# Patient Record
Sex: FEMALE | Race: BLACK OR AFRICAN AMERICAN | NOT HISPANIC OR LATINO | URBAN - METROPOLITAN AREA
[De-identification: names, ages, dates, MRNs, and addresses within clinical notes are randomized per-mention and may not be internally consistent; named-entity substitution may affect disease eponyms.]

---

## 2019-07-23 ENCOUNTER — OUTPATIENT (OUTPATIENT)
Dept: OUTPATIENT SERVICES | Facility: HOSPITAL | Age: 39
LOS: 1 days | Discharge: HOME | End: 2019-07-23

## 2019-07-24 DIAGNOSIS — Z01.21 ENCOUNTER FOR DENTAL EXAMINATION AND CLEANING WITH ABNORMAL FINDINGS: ICD-10-CM

## 2019-08-09 ENCOUNTER — OUTPATIENT (OUTPATIENT)
Dept: OUTPATIENT SERVICES | Facility: HOSPITAL | Age: 39
LOS: 1 days | Discharge: HOME | End: 2019-08-09

## 2025-02-20 ENCOUNTER — EMERGENCY (EMERGENCY)
Facility: HOSPITAL | Age: 45
LOS: 0 days | Discharge: HOME | End: 2025-02-20
Attending: STUDENT IN AN ORGANIZED HEALTH CARE EDUCATION/TRAINING PROGRAM
Payer: MEDICAID

## 2025-02-20 VITALS
DIASTOLIC BLOOD PRESSURE: 85 MMHG | HEART RATE: 101 BPM | RESPIRATION RATE: 20 BRPM | SYSTOLIC BLOOD PRESSURE: 137 MMHG | OXYGEN SATURATION: 98 % | TEMPERATURE: 98 F | WEIGHT: 167.99 LBS | HEIGHT: 65 IN

## 2025-02-20 VITALS
RESPIRATION RATE: 19 BRPM | HEART RATE: 84 BPM | OXYGEN SATURATION: 100 % | SYSTOLIC BLOOD PRESSURE: 132 MMHG | DIASTOLIC BLOOD PRESSURE: 79 MMHG | TEMPERATURE: 98 F

## 2025-02-20 DIAGNOSIS — R10.11 RIGHT UPPER QUADRANT PAIN: ICD-10-CM

## 2025-02-20 DIAGNOSIS — R68.83 CHILLS (WITHOUT FEVER): ICD-10-CM

## 2025-02-20 DIAGNOSIS — N83.8 OTHER NONINFLAMMATORY DISORDERS OF OVARY, FALLOPIAN TUBE AND BROAD LIGAMENT: ICD-10-CM

## 2025-02-20 DIAGNOSIS — R10.31 RIGHT LOWER QUADRANT PAIN: ICD-10-CM

## 2025-02-20 DIAGNOSIS — D25.9 LEIOMYOMA OF UTERUS, UNSPECIFIED: ICD-10-CM

## 2025-02-20 LAB
ALBUMIN SERPL ELPH-MCNC: 4.2 G/DL — SIGNIFICANT CHANGE UP (ref 3.5–5.2)
ALP SERPL-CCNC: 75 U/L — SIGNIFICANT CHANGE UP (ref 30–115)
ALT FLD-CCNC: 16 U/L — SIGNIFICANT CHANGE UP (ref 0–41)
ANION GAP SERPL CALC-SCNC: 10 MMOL/L — SIGNIFICANT CHANGE UP (ref 7–14)
APPEARANCE UR: CLEAR — SIGNIFICANT CHANGE UP
AST SERPL-CCNC: 25 U/L — SIGNIFICANT CHANGE UP (ref 0–41)
BASOPHILS # BLD AUTO: 0.05 K/UL — SIGNIFICANT CHANGE UP (ref 0–0.2)
BASOPHILS NFR BLD AUTO: 0.6 % — SIGNIFICANT CHANGE UP (ref 0–1)
BILIRUB SERPL-MCNC: <0.2 MG/DL — SIGNIFICANT CHANGE UP (ref 0.2–1.2)
BILIRUB UR-MCNC: NEGATIVE — SIGNIFICANT CHANGE UP
BUN SERPL-MCNC: 8 MG/DL — LOW (ref 10–20)
CALCIUM SERPL-MCNC: 8.9 MG/DL — SIGNIFICANT CHANGE UP (ref 8.4–10.5)
CHLORIDE SERPL-SCNC: 101 MMOL/L — SIGNIFICANT CHANGE UP (ref 98–110)
CO2 SERPL-SCNC: 23 MMOL/L — SIGNIFICANT CHANGE UP (ref 17–32)
COLOR SPEC: YELLOW — SIGNIFICANT CHANGE UP
CREAT SERPL-MCNC: 0.9 MG/DL — SIGNIFICANT CHANGE UP (ref 0.7–1.5)
DIFF PNL FLD: NEGATIVE — SIGNIFICANT CHANGE UP
EGFR: 80 ML/MIN/1.73M2 — SIGNIFICANT CHANGE UP
EOSINOPHIL # BLD AUTO: 0.48 K/UL — SIGNIFICANT CHANGE UP (ref 0–0.7)
EOSINOPHIL NFR BLD AUTO: 6.1 % — SIGNIFICANT CHANGE UP (ref 0–8)
GLUCOSE SERPL-MCNC: 108 MG/DL — HIGH (ref 70–99)
GLUCOSE UR QL: NEGATIVE MG/DL — SIGNIFICANT CHANGE UP
HCG SERPL QL: NEGATIVE — SIGNIFICANT CHANGE UP
HCT VFR BLD CALC: 33.3 % — LOW (ref 37–47)
HGB BLD-MCNC: 10.8 G/DL — LOW (ref 12–16)
IMM GRANULOCYTES NFR BLD AUTO: 0.3 % — SIGNIFICANT CHANGE UP (ref 0.1–0.3)
KETONES UR-MCNC: NEGATIVE MG/DL — SIGNIFICANT CHANGE UP
LEUKOCYTE ESTERASE UR-ACNC: NEGATIVE — SIGNIFICANT CHANGE UP
LIDOCAIN IGE QN: 45 U/L — SIGNIFICANT CHANGE UP (ref 7–60)
LYMPHOCYTES # BLD AUTO: 1.58 K/UL — SIGNIFICANT CHANGE UP (ref 1.2–3.4)
LYMPHOCYTES # BLD AUTO: 20 % — LOW (ref 20.5–51.1)
MCHC RBC-ENTMCNC: 28.1 PG — SIGNIFICANT CHANGE UP (ref 27–31)
MCHC RBC-ENTMCNC: 32.4 G/DL — SIGNIFICANT CHANGE UP (ref 32–37)
MCV RBC AUTO: 86.5 FL — SIGNIFICANT CHANGE UP (ref 81–99)
MONOCYTES # BLD AUTO: 0.87 K/UL — HIGH (ref 0.1–0.6)
MONOCYTES NFR BLD AUTO: 11 % — HIGH (ref 1.7–9.3)
NEUTROPHILS # BLD AUTO: 4.89 K/UL — SIGNIFICANT CHANGE UP (ref 1.4–6.5)
NEUTROPHILS NFR BLD AUTO: 62 % — SIGNIFICANT CHANGE UP (ref 42.2–75.2)
NITRITE UR-MCNC: NEGATIVE — SIGNIFICANT CHANGE UP
NRBC BLD AUTO-RTO: 0 /100 WBCS — SIGNIFICANT CHANGE UP (ref 0–0)
PH UR: 7.5 — SIGNIFICANT CHANGE UP (ref 5–8)
PLATELET # BLD AUTO: 413 K/UL — HIGH (ref 130–400)
PMV BLD: 9.5 FL — SIGNIFICANT CHANGE UP (ref 7.4–10.4)
POTASSIUM SERPL-MCNC: 4.6 MMOL/L — SIGNIFICANT CHANGE UP (ref 3.5–5)
POTASSIUM SERPL-SCNC: 4.6 MMOL/L — SIGNIFICANT CHANGE UP (ref 3.5–5)
PROT SERPL-MCNC: 6.3 G/DL — SIGNIFICANT CHANGE UP (ref 6–8)
PROT UR-MCNC: NEGATIVE MG/DL — SIGNIFICANT CHANGE UP
RBC # BLD: 3.85 M/UL — LOW (ref 4.2–5.4)
RBC # FLD: 14.4 % — SIGNIFICANT CHANGE UP (ref 11.5–14.5)
SODIUM SERPL-SCNC: 134 MMOL/L — LOW (ref 135–146)
SP GR SPEC: 1.01 — SIGNIFICANT CHANGE UP (ref 1–1.03)
UROBILINOGEN FLD QL: 0.2 MG/DL — SIGNIFICANT CHANGE UP (ref 0.2–1)
WBC # BLD: 7.89 K/UL — SIGNIFICANT CHANGE UP (ref 4.8–10.8)
WBC # FLD AUTO: 7.89 K/UL — SIGNIFICANT CHANGE UP (ref 4.8–10.8)

## 2025-02-20 PROCEDURE — 74177 CT ABD & PELVIS W/CONTRAST: CPT | Mod: 26

## 2025-02-20 PROCEDURE — 99283 EMERGENCY DEPT VISIT LOW MDM: CPT

## 2025-02-20 PROCEDURE — 76856 US EXAM PELVIC COMPLETE: CPT | Mod: 26

## 2025-02-20 PROCEDURE — 99285 EMERGENCY DEPT VISIT HI MDM: CPT

## 2025-02-20 PROCEDURE — 99284 EMERGENCY DEPT VISIT MOD MDM: CPT | Mod: 25

## 2025-02-20 RX ORDER — ALPRAZOLAM 2 MG
0.5 TABLET ORAL ONCE
Refills: 0 | Status: DISCONTINUED | OUTPATIENT
Start: 2025-02-20 | End: 2025-02-20

## 2025-02-20 RX ORDER — MORPHINE SULFATE 60 MG/1
4 TABLET, FILM COATED, EXTENDED RELEASE ORAL ONCE
Refills: 0 | Status: DISCONTINUED | OUTPATIENT
Start: 2025-02-20 | End: 2025-02-20

## 2025-02-20 RX ADMIN — Medication 0.5 MILLIGRAM(S): at 16:19

## 2025-02-20 NOTE — ED PROVIDER NOTE - PROGRESS NOTE DETAILS
Unable to get transvaginal as patient has never had intercourse, is very painful per tactile and per patient.  GYN is aware of this and would like to see patient at the [facility].  Patient agreeable for transfer Unable to get transvaginal as patient has never had intercourse, is very painful per tech and per patient.  GYN is aware of this and would like to see patient at the [facility].  Patient agreeable for transfer Salinas Bojorquez, DO: arrived at [place]. pt comfortable at this time. GYN aware. Salinas Bojorquez, DO: domingo evaluated pt. recs appreciated. will dc with outpt followup.

## 2025-02-20 NOTE — ED PROVIDER NOTE - PHYSICAL EXAMINATION
Physical Exam    Vital Signs: I have reviewed the initial vital signs.  Constitutional: appears stated age, no acute distress  Eyes: Conjunctiva pink, Sclera clear,  Cardiovascular: S1 and S2, regular rate, regular rhythm, well-perfused extremities, radial pulses equal and 2+, pedal pulses 2+ and equal  Respiratory: unlabored respiratory effort, clear to auscultation bilaterally no wheezing, rales and rhonchi  Gastrointestinal: soft,  Tenderness palpation suprapubic and right lower quadrant., no pulsatile mass, normal bowl sounds  Musculoskeletal: supple neck, no lower extremity edema, no midline tenderness  Integumentary: warm, dry, no rash  Neurologic: awake, alert, nvi

## 2025-02-20 NOTE — CONSULT NOTE ADULT - SUBJECTIVE AND OBJECTIVE BOX
Chief Complaint: RLQ pain    HPI: 45yo , LMP 25, sent from [facility] ED with RLQ pain, now resolved, that began this AM.  Patient states she had sharp pain in her RLQ that woke her out of sleep and lasted for a few hours.  During this episode, patient experienced chills and a subjective fever.  Patient rated the pain a 4/10, and now a 0/10.  Patient did not receive any pain medication in the ED or at home.  States she gets regular monthly cycles, which are heavy for the first 3 days, requiring 2-3pads/day.  Reports mild abdominal cramping with the beginning of her period.  Patient has not seen a gyn, but did have a TVUS in 2018 that was negative.    Ob/Gyn History:                 LMP - 25                   Cycle Length - regular, monthly  Denies history of ovarian cysts, uterine fibroids prior to today's findings, or STIs  Last Pap Smear - never  Last Mammogram - never    Patient seen and examined bedside. Pain well controlled at this time. No complaints at this time. Home Medications:      Allergies    No Known Allergies    Intolerances        PAST MEDICAL & SURGICAL HISTORY:      FAMILY HISTORY: denies h/o bleeding/clotting disorders, ovarian, uterine, or breast cancer      SOCIAL HISTORY: Denies cigarette use, alcohol use, or illicit drug use    Vital Signs Last 24 Hrs  T(F): 97.7 (2025 18:40), Max: 97.8 (2025 12:02)  HR: 84 (2025 18:40) (84 - 101)  BP: 132/79 (2025 18:40) (132/79 - 137/85)  RR: 19 (2025 18:40) (19 - 20)  Height (cm): 165.1 (25 @ 12:02)  Weight (kg): 76.2 (25 @ 12:02)  BMI (kg/m2): 28 (25 @ 12:02)  BSA (m2): 1.84 (25 @ 12:02)    General Appearance - AAOx3, NAD  Heart - S1S2 regular rate and rhythm  Lung - CTA Bilaterally  Abdomen - Soft, nontender, nondistended, no rebound, no rigidity, no guarding, bowel sounds present    GYN/Pelvis: declined pelvic exam    Meds:   ALPRAZolam 0.5 milliGRAM(s) Oral Once  morphine  - Injectable 4 milliGRAM(s) IV Push Once    Height (cm): 165.1 (25 @ 12:02)  Weight (kg): 76.2 (25 @ 12:02)  BMI (kg/m2): 28 (25 @ 12:02)  BSA (m2): 1.84 (25 @ 12:02)    LABS:                        10.8   7.89  )-----------( 413      ( 2025 12:33 )             33.3             134[L]  |  101  |  8[L]  ----------------------------<  108[H]  4.6   |  23  |  0.9    Ca    8.9      2025 12:33    TPro  6.3  /  Alb  4.2  /  TBili  <0.2  /  DBili  x   /  AST  25  /  ALT  16  /  AlkPhos  75        Urinalysis Basic - ( 2025 12:49 )    Color: Yellow / Appearance: Clear / S.009 / pH: x  Gluc: x / Ketone: Negative mg/dL  / Bili: Negative / Urobili: 0.2 mg/dL   Blood: x / Protein: Negative mg/dL / Nitrite: Negative   Leuk Esterase: Negative / RBC: x / WBC x   Sq Epi: x / Non Sq Epi: x / Bacteria: x          RADIOLOGY & ADDITIONAL STUDIES:    < from: US Pelvis Complete (US Pelvis Complete .) (25 @ 14:10) >  ACC: [id number] EXAM:  US PELVIC COMPLETE   ORDERED BY: MELANIA MONSIVAIS     PROCEDURE DATE:  2025          INTERPRETATION:  CLINICAL INFORMATION: Left adnexal mass, abdominal pain    LMP: 2025    COMPARISON: Same day CT abdomen and pelvis    TECHNIQUE:  Transabdominal pelvic sonogram only. Color and Spectral Doppler was   performed.    FINDINGS:  Uterus: 13.7 cm x 7.8 cm x 10.8 cm. Multiple fibroids measuring up to 5.8   cm  Endometrium: 8 mm. Within normal limits.    Right ovary: 4.1cm x 2.8 cm x 3.4 cm. Within normal limits. Normal   arterial and venous waveforms.  Left ovary: Not visualized. Left adnexal hypoechoic structure measuring   7.4 x 4.9 x 5.5 cm and does not appear to demonstrate vascular flow.    Fluid: None.    IMPRESSION:    Left adnexal hypoechoic structure measuring up to 7.4 cm and does not   appear to demonstrate vascular flow. Findings may represent a hemorrhagic   cyst or endometriosis.    Fibroid uterus.    --- End of Report ---          JOYCE ROMAN MD; Resident Radiologist  This document has been electronically signed.  FAVIAN SAUCEDO MD; Attending Interventional Radiologist  This document has been electronically signed. 2025  2:39PM    < end of copied text >  < from: CT Abdomen and Pelvis w/ IV Cont (25 @ 13:38) >  ACC: [id number] EXAM:  CT ABDOMEN AND PELVIS IC   ORDERED BY: MELANIA MONSIVAIS     PROCEDURE DATE:  2025          INTERPRETATION:  CLINICAL INFORMATION: Abdominal pain    COMPARISON: None.    CONTRAST/COMPLICATIONS:  IV Contrast: Omnipaque 350 95 cc administered   5 cc discarded  Oral Contrast: NONE  Complications: None.    PROCEDURE:  CT of the Abdomen and Pelvis was performed.  Sagittal and coronal reformats were performed.    FINDINGS:  LOWER CHEST: Within normal limits.    LIVER: Within normal limits.  BILE DUCTS: Normal caliber.  GALLBLADDER: Within normal limits.  SPLEEN: Within normal limits.  PANCREAS: Within normal limits.  ADRENALS: Within normal limits.  KIDNEYS/URETERS: Mild right hydronephrosis. Symmetric renal enhancement.   Subcentimeter right renal hypodensity too small to further characterize.    BLADDER: Within normal limits.  REPRODUCTIVE ORGANS: Bulky uterine masses/fibroids including possibly   pedunculated right-sided fibroid. Left adnexal 8.7 x 5.2 cm indeterminate   hypodense mass (series 301 image 86).    BOWEL: No bowel obstruction. Appendix is normal.  PERITONEUM/RETROPERITONEUM: Within normal limits.  VESSELS: Within normal limits.  LYMPH NODES: No lymphadenopathy.  ABDOMINAL WALL: Within normal limits.  BONES: Within normal limits.    IMPRESSION:    Bulky uterine masses/fibroids with likely mass effect on right ureter   resulting in mild hydronephrosis.    Indeterminate left adnexal 8.7 cm hypodense mass.    --- End of Report ---            ELLIOT LANDAU MD; Attending Radiologist  This document has been electronically signed. 2025  1:44PM    < end of copied text >

## 2025-02-20 NOTE — ED PROVIDER NOTE - PATIENT PORTAL LINK FT
You can access the FollowMyHealth Patient Portal offered by [facility] by registering at the following website: [email or web address]. By joining [facility]’s FollowMyHealth portal, you will also be able to view your health information using other applications (apps) compatible with our system.

## 2025-02-20 NOTE — ED PROVIDER NOTE - OBJECTIVE STATEMENT
44-year-old female no past medical history presents emergency department for 44-year-old female presents ER for right lower abdominal pain started this morning associated with chills. Denies fever, chills, cp, sob, nvd, dysuria, hematuria.

## 2025-02-20 NOTE — CONSULT NOTE ADULT - ATTENDING COMMENTS
Patient seen and examined at bedside in [facility] for consult from ER for ovarian cyst. Patient initially presented to [facility] ER for RLQ pain and found to have a left adnexal cyst, possible pedunculated right uterine fibroid. Patient currently reports pain has resolved. Patient unable to tolerate a transvaginal US and cyst difficult to characterize with TAUS due to location posterior to fibroid uterus. Discussed with patient etiologies of cysts and need for further outpatient imaging and follow up to further characterize cyst as well as resolution. Discussed fibroid uterus and possiblity of pedunculated fibroid torsion as well. Dispo per ER. Pain precautions given

## 2025-02-20 NOTE — ED PROVIDER NOTE - NSFOLLOWUPCLINICS_GEN_ALL_ED_FT
[facility]  OB/GYN  [address] [place] [address]  Phone: [phone]  Fax:   Follow Up Time: Routine

## 2025-02-20 NOTE — ED ADULT NURSE REASSESSMENT NOTE - NS ED NURSE REASSESS COMMENT FT1
keyla from [facility] sent for ro torsion pt A&OX4, VSS cleared by Domenic Anderson for [facility] ED

## 2025-02-20 NOTE — CONSULT NOTE ADULT - ASSESSMENT
A/P: 45yo G0, LMP 2/6/25, with resolved RLQ pain, found to have fibroid uterus and 8.7cm left adnexal mass on CT.  Clinically and hemodynamically stable.    -no acute gyn intervention at this time  -to follow up with [facility] outpatient for adnexal mass and uterine fibroid follow up  -torsion precautions discussed  -dispo per ED    Discussed with Dr. Orta and Dr. Donald

## 2025-02-20 NOTE — ED PROVIDER NOTE - ATTENDING APP SHARED VISIT CONTRIBUTION OF CARE
44-year-old female LMP earlier this month presents for evaluation of right-sided lower abdominal pain that started this morning.  Patient was seen at urgent care and sent here for imaging.  No nausea vomiting, no fever or chills, no urinary complaints, on exam patient in NAD, AAOx3, abdomen is soft, positive tenderness still to right side of abdomen, positive tenderness, no skin changes, no CVA tenderness

## 2025-02-20 NOTE — ED ADULT NURSE NOTE - NSFALLUNIVINTERV_ED_ALL_ED
Bed/Stretcher in lowest position, wheels locked, appropriate side rails in place/Call bell, personal items and telephone in reach/Instruct patient to call for assistance before getting out of bed/chair/stretcher/Non-slip footwear applied when patient is off stretcher/[place]othier to call system/Physically safe environment - no spills, clutter or unnecessary equipment/Purposeful proactive rounding/Room/bathroom lighting operational, light cord in reach

## 2025-02-20 NOTE — ED PROVIDER NOTE - CLINICAL SUMMARY MEDICAL DECISION MAKING FREE TEXT BOX
44-year-old female presented to [facility] for evaluation of right-sided lower abdominal pain that started this morning.  Patient was transferred [facility] for OB-gyn evaluation.  Labs were ordered and reviewed.  Imaging was ordered and reviewed by me.  Left adnexal hypoechoic structure 7.4 cm.  Appropriate medications for patient's presenting complaints were ordered and effects were reassessed.  Patient arrived at [facility] in no acute distress.  OB-gyn evaluated patient.  OB-gyn without acute interventino at this time but recommending follow up with [facility] outpatient for adnexal mass and uterine fibroid follow up and torsion precautions discussed.  Discussed all results with patient.  Given strict return precautions for new or worsening symptoms and follow up outpatient.  Patient comfortable with plan.

## 2025-02-20 NOTE — ED PROVIDER NOTE - NSFOLLOWUPINSTRUCTIONS_ED_ALL_ED_FT
There is multiple fibroids and the left adnexal mass all your imaging results.  [facility]  Return to the emergency department if you are having extensive vaginal bleeding, worsening abdominal/pelvic pain, fevers, intractable vomiting, worsening symptoms.    You can alternate taking Acetaminophen(Tylenol) every 6 hours and Ibuprofen (Advil/Motrin) every 6 hours as needed for symptoms.  For example:  At 12:00pm: take Tylenol.  At 3:00pm: take Ibuprofen.  At 6:00pm: take Tylenol  At 9:00pm: take Ibuprofen.    Medical Clearance    A medical screening exam has been done. This exam is meant to determine whether you need emergency treatment. Your exam has not demonstrated the need for emergency treatment at this point. It is safe for you to go to your caregiver's office or clinic for further evaluation and/or treatment. You should make an appointment to see your caregiver as soon as he or she is available.

## 2025-04-28 PROBLEM — Z00.00 ENCOUNTER FOR PREVENTIVE HEALTH EXAMINATION: Status: ACTIVE | Noted: 2025-04-28

## 2025-05-01 ENCOUNTER — OUTPATIENT (OUTPATIENT)
Dept: OUTPATIENT SERVICES | Facility: HOSPITAL | Age: 45
LOS: 1 days | End: 2025-05-01
Payer: MEDICAID

## 2025-05-01 ENCOUNTER — APPOINTMENT (OUTPATIENT)
Dept: OBGYN | Facility: CLINIC | Age: 45
End: 2025-05-01

## 2025-05-01 VITALS
WEIGHT: 167 LBS | DIASTOLIC BLOOD PRESSURE: 80 MMHG | SYSTOLIC BLOOD PRESSURE: 124 MMHG | BODY MASS INDEX: 25.31 KG/M2 | HEIGHT: 68 IN

## 2025-05-01 DIAGNOSIS — N83.202 UNSPECIFIED OVARIAN CYST, LEFT SIDE: ICD-10-CM

## 2025-05-01 DIAGNOSIS — Z00.00 ENCOUNTER FOR GENERAL ADULT MEDICAL EXAMINATION WITHOUT ABNORMAL FINDINGS: ICD-10-CM

## 2025-05-01 PROCEDURE — 99214 OFFICE O/P EST MOD 30 MIN: CPT

## 2025-05-15 DIAGNOSIS — N83.202 UNSPECIFIED OVARIAN CYST, LEFT SIDE: ICD-10-CM

## 2025-05-23 ENCOUNTER — ASOB RESULT (OUTPATIENT)
Age: 45
End: 2025-05-23

## 2025-05-23 ENCOUNTER — APPOINTMENT (OUTPATIENT)
Dept: ANTEPARTUM | Facility: CLINIC | Age: 45
End: 2025-05-23
Payer: MEDICAID

## 2025-05-23 ENCOUNTER — OUTPATIENT (OUTPATIENT)
Dept: OUTPATIENT SERVICES | Facility: HOSPITAL | Age: 45
LOS: 1 days | End: 2025-05-23
Payer: MEDICAID

## 2025-05-23 DIAGNOSIS — Z00.00 ENCOUNTER FOR GENERAL ADULT MEDICAL EXAMINATION WITHOUT ABNORMAL FINDINGS: ICD-10-CM

## 2025-05-23 DIAGNOSIS — N83.202 UNSPECIFIED OVARIAN CYST, LEFT SIDE: ICD-10-CM

## 2025-05-23 PROCEDURE — 76856 US EXAM PELVIC COMPLETE: CPT | Mod: 26,59

## 2025-05-23 PROCEDURE — 76830 TRANSVAGINAL US NON-OB: CPT | Mod: 26

## 2025-05-23 PROCEDURE — 76856 US EXAM PELVIC COMPLETE: CPT

## 2025-05-23 PROCEDURE — 76830 TRANSVAGINAL US NON-OB: CPT

## 2025-05-30 DIAGNOSIS — D25.9 LEIOMYOMA OF UTERUS, UNSPECIFIED: ICD-10-CM

## 2025-05-30 DIAGNOSIS — N83.292 OTHER OVARIAN CYST, LEFT SIDE: ICD-10-CM

## 2025-06-26 ENCOUNTER — APPOINTMENT (OUTPATIENT)
Dept: OBGYN | Facility: CLINIC | Age: 45
End: 2025-06-26

## 2025-06-26 ENCOUNTER — NON-APPOINTMENT (OUTPATIENT)
Age: 45
End: 2025-06-26

## 2025-06-26 ENCOUNTER — OUTPATIENT (OUTPATIENT)
Dept: OUTPATIENT SERVICES | Facility: HOSPITAL | Age: 45
LOS: 1 days | End: 2025-06-26
Payer: MEDICAID

## 2025-06-26 VITALS
BODY MASS INDEX: 24.4 KG/M2 | DIASTOLIC BLOOD PRESSURE: 88 MMHG | SYSTOLIC BLOOD PRESSURE: 136 MMHG | HEIGHT: 68 IN | WEIGHT: 161 LBS

## 2025-06-26 DIAGNOSIS — Z00.00 ENCOUNTER FOR GENERAL ADULT MEDICAL EXAMINATION WITHOUT ABNORMAL FINDINGS: ICD-10-CM

## 2025-06-26 PROBLEM — N83.209 OVARIAN CYST: Status: ACTIVE | Noted: 2025-06-26

## 2025-06-26 PROCEDURE — 58100 BIOPSY OF UTERUS LINING: CPT

## 2025-06-26 PROCEDURE — 99213 OFFICE O/P EST LOW 20 MIN: CPT | Mod: 25

## 2025-06-26 PROCEDURE — 99459 PELVIC EXAMINATION: CPT

## 2025-06-26 PROCEDURE — ZZZZZ: CPT

## 2025-06-26 PROCEDURE — 81025 URINE PREGNANCY TEST: CPT

## 2025-06-26 PROCEDURE — 99396 PREV VISIT EST AGE 40-64: CPT | Mod: 25

## 2025-06-26 PROCEDURE — 99215 OFFICE O/P EST HI 40 MIN: CPT

## 2025-06-26 PROCEDURE — 99214 OFFICE O/P EST MOD 30 MIN: CPT

## 2025-06-26 PROCEDURE — G0444: CPT

## 2025-07-01 ENCOUNTER — OUTPATIENT (OUTPATIENT)
Dept: OUTPATIENT SERVICES | Facility: HOSPITAL | Age: 45
LOS: 1 days | End: 2025-07-01
Payer: MEDICAID

## 2025-07-01 DIAGNOSIS — N83.209 UNSPECIFIED OVARIAN CYST, UNSPECIFIED SIDE: ICD-10-CM

## 2025-07-01 PROCEDURE — 86336 INHIBIN A: CPT

## 2025-07-01 PROCEDURE — 86301 IMMUNOASSAY TUMOR CA 19-9: CPT

## 2025-07-01 PROCEDURE — 86304 IMMUNOASSAY TUMOR CA 125: CPT

## 2025-07-01 PROCEDURE — 82378 CARCINOEMBRYONIC ANTIGEN: CPT

## 2025-07-01 PROCEDURE — 86850 RBC ANTIBODY SCREEN: CPT

## 2025-07-01 PROCEDURE — 83520 IMMUNOASSAY QUANT NOS NONAB: CPT

## 2025-07-01 PROCEDURE — 85025 COMPLETE CBC W/AUTO DIFF WBC: CPT

## 2025-07-01 PROCEDURE — 84702 CHORIONIC GONADOTROPIN TEST: CPT

## 2025-07-01 PROCEDURE — 83615 LACTATE (LD) (LDH) ENZYME: CPT

## 2025-07-01 PROCEDURE — 86900 BLOOD TYPING SEROLOGIC ABO: CPT

## 2025-07-01 PROCEDURE — 86901 BLOOD TYPING SEROLOGIC RH(D): CPT

## 2025-07-01 PROCEDURE — 82105 ALPHA-FETOPROTEIN SERUM: CPT

## 2025-07-02 DIAGNOSIS — N83.209 UNSPECIFIED OVARIAN CYST, UNSPECIFIED SIDE: ICD-10-CM

## 2025-07-05 LAB
ABORH: NORMAL
AFP-TM SERPL-MCNC: 7.3 NG/ML
ANTIBODY SCREEN: NORMAL
BASOPHILS # BLD AUTO: 0.04 K/UL
BASOPHILS NFR BLD AUTO: 0.7 %
CANCER AG125 SERPL-ACNC: 43 U/ML
CANCER AG19-9 SERPL-ACNC: 43 U/ML
CEA SERPL-MCNC: 0.9 NG/ML
EOSINOPHIL # BLD AUTO: 0.29 K/UL
EOSINOPHIL NFR BLD AUTO: 4.8 %
HCG SERPL-MCNC: <1 MIU/ML
HCG UR QL: NEGATIVE
HCT VFR BLD CALC: 35.1 %
HGB BLD-MCNC: 11.4 G/DL
IMM GRANULOCYTES NFR BLD AUTO: 0.3 %
LDH SERPL-CCNC: 168
LYMPHOCYTES # BLD AUTO: 2.11 K/UL
LYMPHOCYTES NFR BLD AUTO: 34.9 %
MAN DIFF?: NORMAL
MCHC RBC-ENTMCNC: 29.5 PG
MCHC RBC-ENTMCNC: 32.5 G/DL
MCV RBC AUTO: 90.7 FL
MONOCYTES # BLD AUTO: 0.53 K/UL
MONOCYTES NFR BLD AUTO: 8.8 %
NEUTROPHILS # BLD AUTO: 3.05 K/UL
NEUTROPHILS NFR BLD AUTO: 50.5 %
PLATELET # BLD AUTO: 319 K/UL
PMV BLD AUTO: 0 /100 WBCS
PMV BLD: 10.6 FL
RBC # BLD: 3.87 M/UL
RBC # FLD: 14.3 %
WBC # FLD AUTO: 6.04 K/UL

## 2025-07-13 PROBLEM — N93.9 ABNORMAL UTERINE BLEEDING: Status: ACTIVE | Noted: 2025-07-13

## 2025-07-24 ENCOUNTER — APPOINTMENT (OUTPATIENT)
Dept: INTERVENTIONAL RADIOLOGY/VASCULAR | Facility: CLINIC | Age: 45
End: 2025-07-24
Payer: MEDICAID

## 2025-07-24 VITALS
TEMPERATURE: 97.2 F | DIASTOLIC BLOOD PRESSURE: 72 MMHG | SYSTOLIC BLOOD PRESSURE: 106 MMHG | HEART RATE: 96 BPM | OXYGEN SATURATION: 99 %

## 2025-07-24 PROCEDURE — 99203 OFFICE O/P NEW LOW 30 MIN: CPT

## 2025-07-28 ENCOUNTER — TRANSCRIPTION ENCOUNTER (OUTPATIENT)
Age: 45
End: 2025-07-28

## 2025-08-25 ENCOUNTER — OUTPATIENT (OUTPATIENT)
Dept: OUTPATIENT SERVICES | Facility: HOSPITAL | Age: 45
LOS: 1 days | End: 2025-08-25
Payer: MEDICAID

## 2025-08-25 VITALS
TEMPERATURE: 98 F | HEART RATE: 81 BPM | WEIGHT: 154.32 LBS | RESPIRATION RATE: 18 BRPM | SYSTOLIC BLOOD PRESSURE: 126 MMHG | OXYGEN SATURATION: 100 % | HEIGHT: 65 IN | DIASTOLIC BLOOD PRESSURE: 84 MMHG

## 2025-08-25 DIAGNOSIS — Z01.818 ENCOUNTER FOR OTHER PREPROCEDURAL EXAMINATION: ICD-10-CM

## 2025-08-25 DIAGNOSIS — D25.9 LEIOMYOMA OF UTERUS, UNSPECIFIED: ICD-10-CM

## 2025-08-25 DIAGNOSIS — Z78.9 OTHER SPECIFIED HEALTH STATUS: Chronic | ICD-10-CM

## 2025-08-25 LAB
ALBUMIN SERPL ELPH-MCNC: 4.4 G/DL — SIGNIFICANT CHANGE UP (ref 3.5–5.2)
ALP SERPL-CCNC: 72 U/L — SIGNIFICANT CHANGE UP (ref 30–115)
ALT FLD-CCNC: 11 U/L — SIGNIFICANT CHANGE UP (ref 0–41)
ANION GAP SERPL CALC-SCNC: 10 MMOL/L — SIGNIFICANT CHANGE UP (ref 7–14)
APTT BLD: 36.7 SEC — SIGNIFICANT CHANGE UP (ref 27–39.2)
AST SERPL-CCNC: 16 U/L — SIGNIFICANT CHANGE UP (ref 0–41)
BASOPHILS # BLD AUTO: 0.05 K/UL — SIGNIFICANT CHANGE UP (ref 0–0.2)
BASOPHILS NFR BLD AUTO: 0.9 % — SIGNIFICANT CHANGE UP (ref 0–1)
BILIRUB SERPL-MCNC: 0.3 MG/DL — SIGNIFICANT CHANGE UP (ref 0.2–1.2)
BUN SERPL-MCNC: 10 MG/DL — SIGNIFICANT CHANGE UP (ref 10–20)
CALCIUM SERPL-MCNC: 9.1 MG/DL — SIGNIFICANT CHANGE UP (ref 8.4–10.5)
CHLORIDE SERPL-SCNC: 104 MMOL/L — SIGNIFICANT CHANGE UP (ref 98–110)
CO2 SERPL-SCNC: 22 MMOL/L — SIGNIFICANT CHANGE UP (ref 17–32)
CREAT SERPL-MCNC: 0.9 MG/DL — SIGNIFICANT CHANGE UP (ref 0.7–1.5)
EGFR: 80 ML/MIN/1.73M2 — SIGNIFICANT CHANGE UP
EGFR: 80 ML/MIN/1.73M2 — SIGNIFICANT CHANGE UP
EOSINOPHIL # BLD AUTO: 0.28 K/UL — SIGNIFICANT CHANGE UP (ref 0–0.7)
EOSINOPHIL NFR BLD AUTO: 5 % — SIGNIFICANT CHANGE UP (ref 0–8)
GLUCOSE SERPL-MCNC: 83 MG/DL — SIGNIFICANT CHANGE UP (ref 70–99)
HCT VFR BLD CALC: 36 % — LOW (ref 37–47)
HGB BLD-MCNC: 12 G/DL — SIGNIFICANT CHANGE UP (ref 12–16)
IMM GRANULOCYTES NFR BLD AUTO: 0.4 % — HIGH (ref 0.1–0.3)
INR BLD: 0.95 RATIO — SIGNIFICANT CHANGE UP (ref 0.65–1.3)
LYMPHOCYTES # BLD AUTO: 1.8 K/UL — SIGNIFICANT CHANGE UP (ref 1.2–3.4)
LYMPHOCYTES # BLD AUTO: 31.9 % — SIGNIFICANT CHANGE UP (ref 20.5–51.1)
MCHC RBC-ENTMCNC: 30.1 PG — SIGNIFICANT CHANGE UP (ref 27–31)
MCHC RBC-ENTMCNC: 33.3 G/DL — SIGNIFICANT CHANGE UP (ref 32–37)
MCV RBC AUTO: 90.2 FL — SIGNIFICANT CHANGE UP (ref 81–99)
MONOCYTES # BLD AUTO: 0.64 K/UL — HIGH (ref 0.1–0.6)
MONOCYTES NFR BLD AUTO: 11.3 % — HIGH (ref 1.7–9.3)
NEUTROPHILS # BLD AUTO: 2.86 K/UL — SIGNIFICANT CHANGE UP (ref 1.4–6.5)
NEUTROPHILS NFR BLD AUTO: 50.5 % — SIGNIFICANT CHANGE UP (ref 42.2–75.2)
NRBC BLD AUTO-RTO: 0 /100 WBCS — SIGNIFICANT CHANGE UP (ref 0–0)
PLATELET # BLD AUTO: 348 K/UL — SIGNIFICANT CHANGE UP (ref 130–400)
PMV BLD: 10.5 FL — HIGH (ref 7.4–10.4)
POTASSIUM SERPL-MCNC: 3.6 MMOL/L — SIGNIFICANT CHANGE UP (ref 3.5–5)
POTASSIUM SERPL-SCNC: 3.6 MMOL/L — SIGNIFICANT CHANGE UP (ref 3.5–5)
PROT SERPL-MCNC: 6.6 G/DL — SIGNIFICANT CHANGE UP (ref 6–8)
PROTHROM AB SERPL-ACNC: 11.2 SEC — SIGNIFICANT CHANGE UP (ref 9.95–12.87)
RBC # BLD: 3.99 M/UL — LOW (ref 4.2–5.4)
RBC # FLD: 14 % — SIGNIFICANT CHANGE UP (ref 11.5–14.5)
SODIUM SERPL-SCNC: 136 MMOL/L — SIGNIFICANT CHANGE UP (ref 135–146)
WBC # BLD: 5.65 K/UL — SIGNIFICANT CHANGE UP (ref 4.8–10.8)
WBC # FLD AUTO: 5.65 K/UL — SIGNIFICANT CHANGE UP (ref 4.8–10.8)

## 2025-08-25 PROCEDURE — 99214 OFFICE O/P EST MOD 30 MIN: CPT | Mod: 25

## 2025-08-25 PROCEDURE — 85025 COMPLETE CBC W/AUTO DIFF WBC: CPT

## 2025-08-25 PROCEDURE — 93010 ELECTROCARDIOGRAM REPORT: CPT

## 2025-08-25 PROCEDURE — 80053 COMPREHEN METABOLIC PANEL: CPT

## 2025-08-25 PROCEDURE — 85610 PROTHROMBIN TIME: CPT

## 2025-08-25 PROCEDURE — 36415 COLL VENOUS BLD VENIPUNCTURE: CPT

## 2025-08-25 PROCEDURE — 93005 ELECTROCARDIOGRAM TRACING: CPT

## 2025-08-25 PROCEDURE — 85730 THROMBOPLASTIN TIME PARTIAL: CPT

## 2025-08-26 DIAGNOSIS — D25.9 LEIOMYOMA OF UTERUS, UNSPECIFIED: ICD-10-CM

## 2025-08-26 DIAGNOSIS — Z01.818 ENCOUNTER FOR OTHER PREPROCEDURAL EXAMINATION: ICD-10-CM

## 2025-09-08 ENCOUNTER — TRANSCRIPTION ENCOUNTER (OUTPATIENT)
Age: 45
End: 2025-09-08

## 2025-09-08 ENCOUNTER — OUTPATIENT (OUTPATIENT)
Dept: OUTPATIENT SERVICES | Facility: HOSPITAL | Age: 45
LOS: 1 days | Discharge: HOME | End: 2025-09-08
Payer: MEDICAID

## 2025-09-08 ENCOUNTER — RESULT REVIEW (OUTPATIENT)
Age: 45
End: 2025-09-08

## 2025-09-08 ENCOUNTER — EMERGENCY (EMERGENCY)
Facility: HOSPITAL | Age: 45
LOS: 0 days | Discharge: HOME | End: 2025-09-08
Attending: EMERGENCY MEDICINE
Payer: MEDICAID

## 2025-09-08 VITALS
HEART RATE: 89 BPM | HEIGHT: 66 IN | WEIGHT: 154.98 LBS | TEMPERATURE: 98 F | DIASTOLIC BLOOD PRESSURE: 109 MMHG | SYSTOLIC BLOOD PRESSURE: 181 MMHG | RESPIRATION RATE: 19 BRPM

## 2025-09-08 VITALS
RESPIRATION RATE: 18 BRPM | HEART RATE: 80 BPM | SYSTOLIC BLOOD PRESSURE: 167 MMHG | OXYGEN SATURATION: 99 % | DIASTOLIC BLOOD PRESSURE: 110 MMHG

## 2025-09-08 VITALS
WEIGHT: 154.98 LBS | RESPIRATION RATE: 18 BRPM | SYSTOLIC BLOOD PRESSURE: 123 MMHG | HEIGHT: 66 IN | TEMPERATURE: 98 F | DIASTOLIC BLOOD PRESSURE: 78 MMHG | OXYGEN SATURATION: 100 % | HEART RATE: 90 BPM

## 2025-09-08 DIAGNOSIS — T81.718A COMPLICATION OF OTHER ARTERY FOLLOWING A PROCEDURE, NOT ELSEWHERE CLASSIFIED, INITIAL ENCOUNTER: ICD-10-CM

## 2025-09-08 DIAGNOSIS — Z78.9 OTHER SPECIFIED HEALTH STATUS: Chronic | ICD-10-CM

## 2025-09-08 DIAGNOSIS — D25.9 LEIOMYOMA OF UTERUS, UNSPECIFIED: ICD-10-CM

## 2025-09-08 DIAGNOSIS — M79.89 OTHER SPECIFIED SOFT TISSUE DISORDERS: ICD-10-CM

## 2025-09-08 PROCEDURE — 99282 EMERGENCY DEPT VISIT SF MDM: CPT

## 2025-09-08 PROCEDURE — C1889: CPT

## 2025-09-08 PROCEDURE — C1769: CPT

## 2025-09-08 PROCEDURE — 76937 US GUIDE VASCULAR ACCESS: CPT | Mod: 26

## 2025-09-08 PROCEDURE — L9995: CPT

## 2025-09-08 PROCEDURE — 36247 INS CATH ABD/L-EXT ART 3RD: CPT | Mod: 59

## 2025-09-08 PROCEDURE — C1894: CPT

## 2025-09-08 PROCEDURE — C1887: CPT

## 2025-09-08 PROCEDURE — 36247 INS CATH ABD/L-EXT ART 3RD: CPT | Mod: RT

## 2025-09-08 PROCEDURE — 76937 US GUIDE VASCULAR ACCESS: CPT

## 2025-09-08 PROCEDURE — 37243 VASC EMBOLIZE/OCCLUDE ORGAN: CPT

## 2025-09-08 RX ORDER — SCOPOLAMINE 1 MG/3D
1 PATCH, EXTENDED RELEASE TRANSDERMAL ONCE
Refills: 0 | Status: COMPLETED | OUTPATIENT
Start: 2025-09-08 | End: 2025-09-08

## 2025-09-08 RX ORDER — ACETAMINOPHEN 500 MG/5ML
1000 LIQUID (ML) ORAL ONCE
Refills: 0 | Status: DISCONTINUED | OUTPATIENT
Start: 2025-09-08 | End: 2025-09-08

## 2025-09-08 RX ORDER — ONDANSETRON HCL/PF 4 MG/2 ML
4 VIAL (ML) INJECTION ONCE
Refills: 0 | Status: COMPLETED | OUTPATIENT
Start: 2025-09-08 | End: 2025-09-08

## 2025-09-08 RX ORDER — OXYCODONE HYDROCHLORIDE AND ACETAMINOPHEN 10; 325 MG/1; MG/1
1 TABLET ORAL ONCE
Refills: 0 | Status: DISCONTINUED | OUTPATIENT
Start: 2025-09-08 | End: 2025-09-08

## 2025-09-08 RX ORDER — HYDROMORPHONE/SOD CHLOR,ISO/PF 2 MG/10 ML
0.5 SYRINGE (ML) INJECTION ONCE
Refills: 0 | Status: DISCONTINUED | OUTPATIENT
Start: 2025-09-08 | End: 2025-09-08

## 2025-09-08 RX ORDER — DULOXETINE 20 MG/1
1 CAPSULE, DELAYED RELEASE ORAL
Refills: 0 | DISCHARGE

## 2025-09-08 RX ADMIN — OXYCODONE HYDROCHLORIDE AND ACETAMINOPHEN 1 TABLET(S): 10; 325 TABLET ORAL at 12:51

## 2025-09-08 RX ADMIN — Medication 0.5 MILLIGRAM(S): at 13:48

## 2025-09-08 RX ADMIN — OXYCODONE HYDROCHLORIDE AND ACETAMINOPHEN 1 TABLET(S): 10; 325 TABLET ORAL at 12:20

## 2025-09-08 RX ADMIN — Medication 4 MILLIGRAM(S): at 12:20

## 2025-09-08 RX ADMIN — SCOPOLAMINE 1 PATCH: 1 PATCH, EXTENDED RELEASE TRANSDERMAL at 09:40

## 2025-09-10 PROBLEM — J30.2 OTHER SEASONAL ALLERGIC RHINITIS: Chronic | Status: ACTIVE | Noted: 2025-08-25

## 2025-09-10 PROBLEM — F41.9 ANXIETY DISORDER, UNSPECIFIED: Chronic | Status: ACTIVE | Noted: 2025-08-25

## 2025-09-10 PROBLEM — Z87.39 PERSONAL HISTORY OF OTHER DISEASES OF THE MUSCULOSKELETAL SYSTEM AND CONNECTIVE TISSUE: Chronic | Status: ACTIVE | Noted: 2025-08-25

## 2025-09-16 ENCOUNTER — RESULT REVIEW (OUTPATIENT)
Age: 45
End: 2025-09-16

## 2025-09-16 ENCOUNTER — APPOINTMENT (OUTPATIENT)
Dept: INTERVENTIONAL RADIOLOGY/VASCULAR | Facility: CLINIC | Age: 45
End: 2025-09-16
Payer: MEDICAID

## 2025-09-16 VITALS
HEART RATE: 85 BPM | SYSTOLIC BLOOD PRESSURE: 134 MMHG | TEMPERATURE: 97.6 F | DIASTOLIC BLOOD PRESSURE: 90 MMHG | OXYGEN SATURATION: 100 %

## 2025-09-16 DIAGNOSIS — R07.89 OTHER CHEST PAIN: ICD-10-CM

## 2025-09-16 PROCEDURE — 99212 OFFICE O/P EST SF 10 MIN: CPT
